# Patient Record
Sex: MALE | Race: BLACK OR AFRICAN AMERICAN | NOT HISPANIC OR LATINO | URBAN - METROPOLITAN AREA
[De-identification: names, ages, dates, MRNs, and addresses within clinical notes are randomized per-mention and may not be internally consistent; named-entity substitution may affect disease eponyms.]

---

## 2021-05-14 ENCOUNTER — EMERGENCY (EMERGENCY)
Facility: HOSPITAL | Age: 31
LOS: 1 days | Discharge: HOME | End: 2021-05-14
Attending: STUDENT IN AN ORGANIZED HEALTH CARE EDUCATION/TRAINING PROGRAM | Admitting: STUDENT IN AN ORGANIZED HEALTH CARE EDUCATION/TRAINING PROGRAM
Payer: COMMERCIAL

## 2021-05-14 VITALS
DIASTOLIC BLOOD PRESSURE: 71 MMHG | HEART RATE: 81 BPM | SYSTOLIC BLOOD PRESSURE: 126 MMHG | RESPIRATION RATE: 16 BRPM | TEMPERATURE: 98 F | OXYGEN SATURATION: 100 %

## 2021-05-14 VITALS
HEART RATE: 73 BPM | DIASTOLIC BLOOD PRESSURE: 72 MMHG | OXYGEN SATURATION: 100 % | SYSTOLIC BLOOD PRESSURE: 110 MMHG | RESPIRATION RATE: 16 BRPM

## 2021-05-14 PROCEDURE — 93010 ELECTROCARDIOGRAM REPORT: CPT

## 2021-05-14 PROCEDURE — 71046 X-RAY EXAM CHEST 2 VIEWS: CPT | Mod: 26

## 2021-05-14 PROCEDURE — 99284 EMERGENCY DEPT VISIT MOD MDM: CPT | Mod: 25

## 2021-05-14 RX ORDER — FAMOTIDINE 10 MG/ML
20 INJECTION INTRAVENOUS ONCE
Refills: 0 | Status: COMPLETED | OUTPATIENT
Start: 2021-05-14 | End: 2021-05-14

## 2021-05-14 RX ADMIN — FAMOTIDINE 20 MILLIGRAM(S): 10 INJECTION INTRAVENOUS at 15:02

## 2021-05-14 RX ADMIN — Medication 30 MILLILITER(S): at 15:02

## 2021-05-14 NOTE — ED PROVIDER NOTE - PROGRESS NOTE DETAILS
CRISTY Trinh: pt feels better ambulating without difficulty.  Discharge reviewed and discussed with patient.

## 2021-05-14 NOTE — ED PROVIDER NOTE - CLINICAL SUMMARY MEDICAL DECISION MAKING FREE TEXT BOX
29 y/o male with a hx of Asthma presents to the ER c/o 2 days of midsternal chest pain only with swallowing. Pt is well appearing, NAD, EKG NSR, will obtain CXR, Maalox, Pepcid, reassess.

## 2021-05-14 NOTE — ED PROVIDER NOTE - NSFOLLOWUPINSTRUCTIONS_ED_ALL_ED_FT
Follow up with your Doctor in 1-2 days.   Follow up with Gastroenterology in 1-2 days. (see attached list)  Rest. Drink plenty of fluids.  Slowly advance diet.  Return to the ER for persistent/worsening or new symptoms fevers, chills, unable to eat/drink, weakness, dizziness, nausea, vomiting, or any concerning symptoms.

## 2021-05-14 NOTE — ED ADULT TRIAGE NOTE - CHIEF COMPLAINT QUOTE
c/o pain to mid upper chest with swallowing. Denies throat pain. States when swallows food or even saliva, pt increases. No drooling, speaking full sentences. Denies N/V. Hx asthma

## 2021-05-14 NOTE — ED PROVIDER NOTE - PATIENT PORTAL LINK FT
You can access the FollowMyHealth Patient Portal offered by [facility] by registering at the following website: [email or web address]. By joining [facility]’s FollowMyHealth portal, you will also be able to view your health information using other applications (apps) compatible with our system.

## 2021-05-14 NOTE — ED PROVIDER NOTE - OBJECTIVE STATEMENT
31 y/o male with a hx of Asthma presents to the ER c/o 2 days of midsternal chest pain only with swallowing.  Pt denies fevers, chills, cough, shortness of breath, back pain, nausea, vomiting, abdominal pain, diarrhea.  Pt reports normal bowel movements.

## 2021-05-14 NOTE — ED PROVIDER NOTE - ATTENDING CONTRIBUTION TO CARE
30M with pmh asthma presenting with intermittentl midsternal chest discomfort only when swallowing. Discomfort minimal but persistent for 2 days. Denies any other symptoms. No fever, chills, sob, nausea, vomiting, diarrhea, dysuria, changes in weight, night sweats, cough. Denies regular ETOH use. Denies ingestions    GEN: NAD, awake, well appearing  HEENT: NCAT, MMM, normal conjunctiva, perrl, posterior oropharynx clear  CHEST/LUNGS: Non-tachypneic, CTAB, bilateral breath sounds  CARDIAC: Non-tachycardic, s1s2, normal perfusion, no peripheral edema  ABDOMEN: Soft, NTND, No rebound/guarding  MSK: No joint tenderness, no gross deformity of extremities  SKIN: No rashes, no petechiae, no vesicles  NEURO: CN grossly intact, normal coordination, no focal motor or sensory deficits  PSYCH: Alert, appropriate, cooperative     Patient presenting with chest discomfort with swallowing. DDx includes GERD, esophageal spasms. Exam unremarkable, tolerating PO, no respiratory concerns or complaints. Screening CXR with likely outpatient GI referral.

## 2023-08-21 ENCOUNTER — EMERGENCY (EMERGENCY)
Facility: HOSPITAL | Age: 33
LOS: 1 days | Discharge: HOME | End: 2023-08-21
Admitting: EMERGENCY MEDICINE
Payer: COMMERCIAL

## 2023-08-21 VITALS
HEART RATE: 79 BPM | SYSTOLIC BLOOD PRESSURE: 127 MMHG | TEMPERATURE: 98 F | DIASTOLIC BLOOD PRESSURE: 72 MMHG | OXYGEN SATURATION: 98 % | RESPIRATION RATE: 16 BRPM

## 2023-08-21 VITALS
RESPIRATION RATE: 16 BRPM | DIASTOLIC BLOOD PRESSURE: 69 MMHG | OXYGEN SATURATION: 100 % | SYSTOLIC BLOOD PRESSURE: 118 MMHG | TEMPERATURE: 98 F | HEART RATE: 69 BPM

## 2023-08-21 PROBLEM — J45.909 UNSPECIFIED ASTHMA, UNCOMPLICATED: Chronic | Status: ACTIVE | Noted: 2021-05-14

## 2023-08-21 LAB
ALBUMIN SERPL ELPH-MCNC: 4.6 G/DL — SIGNIFICANT CHANGE UP (ref 3.3–5)
ALP SERPL-CCNC: 133 U/L — HIGH (ref 40–120)
ALT FLD-CCNC: 16 U/L — SIGNIFICANT CHANGE UP (ref 4–41)
ANION GAP SERPL CALC-SCNC: 13 MMOL/L — SIGNIFICANT CHANGE UP (ref 7–14)
APPEARANCE UR: CLEAR — SIGNIFICANT CHANGE UP
AST SERPL-CCNC: 24 U/L — SIGNIFICANT CHANGE UP (ref 4–40)
BASOPHILS # BLD AUTO: 0.04 K/UL — SIGNIFICANT CHANGE UP (ref 0–0.2)
BASOPHILS NFR BLD AUTO: 0.5 % — SIGNIFICANT CHANGE UP (ref 0–2)
BILIRUB SERPL-MCNC: 0.7 MG/DL — SIGNIFICANT CHANGE UP (ref 0.2–1.2)
BILIRUB UR-MCNC: NEGATIVE — SIGNIFICANT CHANGE UP
BUN SERPL-MCNC: 12 MG/DL — SIGNIFICANT CHANGE UP (ref 7–23)
CALCIUM SERPL-MCNC: 9.5 MG/DL — SIGNIFICANT CHANGE UP (ref 8.4–10.5)
CHLORIDE SERPL-SCNC: 102 MMOL/L — SIGNIFICANT CHANGE UP (ref 98–107)
CO2 SERPL-SCNC: 26 MMOL/L — SIGNIFICANT CHANGE UP (ref 22–31)
COLOR SPEC: YELLOW — SIGNIFICANT CHANGE UP
CREAT SERPL-MCNC: 1.04 MG/DL — SIGNIFICANT CHANGE UP (ref 0.5–1.3)
DIFF PNL FLD: NEGATIVE — SIGNIFICANT CHANGE UP
EGFR: 96 ML/MIN/1.73M2 — SIGNIFICANT CHANGE UP
EOSINOPHIL # BLD AUTO: 0.23 K/UL — SIGNIFICANT CHANGE UP (ref 0–0.5)
EOSINOPHIL NFR BLD AUTO: 2.7 % — SIGNIFICANT CHANGE UP (ref 0–6)
GLUCOSE SERPL-MCNC: 79 MG/DL — SIGNIFICANT CHANGE UP (ref 70–99)
GLUCOSE UR QL: NEGATIVE MG/DL — SIGNIFICANT CHANGE UP
HCT VFR BLD CALC: 41.3 % — SIGNIFICANT CHANGE UP (ref 39–50)
HGB BLD-MCNC: 13.7 G/DL — SIGNIFICANT CHANGE UP (ref 13–17)
IANC: 4.67 K/UL — SIGNIFICANT CHANGE UP (ref 1.8–7.4)
IMM GRANULOCYTES NFR BLD AUTO: 0.2 % — SIGNIFICANT CHANGE UP (ref 0–0.9)
KETONES UR-MCNC: ABNORMAL MG/DL
LEUKOCYTE ESTERASE UR-ACNC: NEGATIVE — SIGNIFICANT CHANGE UP
LIDOCAIN IGE QN: 43 U/L — SIGNIFICANT CHANGE UP (ref 7–60)
LYMPHOCYTES # BLD AUTO: 2.75 K/UL — SIGNIFICANT CHANGE UP (ref 1–3.3)
LYMPHOCYTES # BLD AUTO: 32.4 % — SIGNIFICANT CHANGE UP (ref 13–44)
MCHC RBC-ENTMCNC: 29.4 PG — SIGNIFICANT CHANGE UP (ref 27–34)
MCHC RBC-ENTMCNC: 33.2 GM/DL — SIGNIFICANT CHANGE UP (ref 32–36)
MCV RBC AUTO: 88.6 FL — SIGNIFICANT CHANGE UP (ref 80–100)
MONOCYTES # BLD AUTO: 0.79 K/UL — SIGNIFICANT CHANGE UP (ref 0–0.9)
MONOCYTES NFR BLD AUTO: 9.3 % — SIGNIFICANT CHANGE UP (ref 2–14)
NEUTROPHILS # BLD AUTO: 4.67 K/UL — SIGNIFICANT CHANGE UP (ref 1.8–7.4)
NEUTROPHILS NFR BLD AUTO: 54.9 % — SIGNIFICANT CHANGE UP (ref 43–77)
NITRITE UR-MCNC: NEGATIVE — SIGNIFICANT CHANGE UP
NRBC # BLD: 0 /100 WBCS — SIGNIFICANT CHANGE UP (ref 0–0)
NRBC # FLD: 0 K/UL — SIGNIFICANT CHANGE UP (ref 0–0)
PH UR: 6.5 — SIGNIFICANT CHANGE UP (ref 5–8)
PLATELET # BLD AUTO: 200 K/UL — SIGNIFICANT CHANGE UP (ref 150–400)
POTASSIUM SERPL-MCNC: 4.2 MMOL/L — SIGNIFICANT CHANGE UP (ref 3.5–5.3)
POTASSIUM SERPL-SCNC: 4.2 MMOL/L — SIGNIFICANT CHANGE UP (ref 3.5–5.3)
PROT SERPL-MCNC: 7.7 G/DL — SIGNIFICANT CHANGE UP (ref 6–8.3)
PROT UR-MCNC: SIGNIFICANT CHANGE UP MG/DL
RBC # BLD: 4.66 M/UL — SIGNIFICANT CHANGE UP (ref 4.2–5.8)
RBC # FLD: 12.6 % — SIGNIFICANT CHANGE UP (ref 10.3–14.5)
SODIUM SERPL-SCNC: 141 MMOL/L — SIGNIFICANT CHANGE UP (ref 135–145)
SP GR SPEC: 1.03 — SIGNIFICANT CHANGE UP (ref 1–1.03)
UROBILINOGEN FLD QL: 1 MG/DL — SIGNIFICANT CHANGE UP (ref 0.2–1)
WBC # BLD: 8.5 K/UL — SIGNIFICANT CHANGE UP (ref 3.8–10.5)
WBC # FLD AUTO: 8.5 K/UL — SIGNIFICANT CHANGE UP (ref 3.8–10.5)

## 2023-08-21 PROCEDURE — 74177 CT ABD & PELVIS W/CONTRAST: CPT | Mod: 26,MA

## 2023-08-21 PROCEDURE — 99285 EMERGENCY DEPT VISIT HI MDM: CPT

## 2023-08-21 RX ORDER — SODIUM CHLORIDE 9 MG/ML
1000 INJECTION INTRAMUSCULAR; INTRAVENOUS; SUBCUTANEOUS ONCE
Refills: 0 | Status: COMPLETED | OUTPATIENT
Start: 2023-08-21 | End: 2023-08-21

## 2023-08-21 RX ADMIN — SODIUM CHLORIDE 1000 MILLILITER(S): 9 INJECTION INTRAMUSCULAR; INTRAVENOUS; SUBCUTANEOUS at 11:24

## 2023-08-21 NOTE — ED ADULT TRIAGE NOTE - CHIEF COMPLAINT QUOTE
pt c/o 2 weeks right lower quadrant pain, mild diarrhea at times, denies fever, n/v.  went to UC today and told to come to ED for eval

## 2023-08-21 NOTE — ED PROVIDER NOTE - PHYSICAL EXAMINATION
General: Well appearing in no acute distress, alert and cooperative  Head: Normocephalic, atraumatic  Eyes: PERRLA  Neck: Soft and supple  Cardiac: Regular rate and regular rhythm, no murmurs  Resp: Unlabored respiratory effort, lungs CTAB, speaking in full sentences, no wheezes, rhonchi or rales  Abd: +tenderness in RLQ. +McBurney's point. +psoas sign. All other quadrants without tenderness to palpation. Abd is soft, non-distended, no guarding or rebound tenderness  :  MSK: Spine midline and non-tender, no CVA tenderness b/l  Skin: Warm and dry, no rashes/abrasions/lacerations  Neuro: AO x 3, moves all extremities symmetrically, Motor strength 5/5 bilaterally UE and LE, sensation grossly intact General: Well appearing in no acute distress, alert and cooperative  Head: Normocephalic, atraumatic  Eyes: PERRLA  Neck: Soft and supple  Cardiac: Regular rate and regular rhythm, no murmurs  Resp: Unlabored respiratory effort, lungs CTAB, speaking in full sentences, no wheezes, rhonchi or rales  Abd: +tenderness in RLQ. +McBurney's point. +psoas sign. All other quadrants without tenderness to palpation. Abd is soft, non-distended, no guarding or rebound tenderness  : Performed by CRISTY Rm, chaperoned by CRISTY Reed. No testicular pain or swelling. No masses palpated.   MSK: Spine midline and non-tender, no CVA tenderness b/l  Skin: Warm and dry, no rashes/abrasions/lacerations  Neuro: AO x 3, moves all extremities symmetrically, Motor strength 5/5 bilaterally UE and LE, sensation grossly intact

## 2023-08-21 NOTE — ED PROVIDER NOTE - OBJECTIVE STATEMENT
32 y/o M with no pertinent pmhx presents to the ED c/o RLQ pain x 1.5 weeks. Pt states shortly after playing basketball he developed pain in RLQ. Pt reports gradual onset, 7/10 sharp intermittent pain that he states has been improving over the last week. Pt did not take any medication at home for pain. Pt notes some associated diarrhea over the last week, 2-3x per day, loose stool, denies hematochezia or melena, last BM this AM. Denies fever/chills, N/V, constipation, back pain, CP, SOB, dysuria, testicular pain or swelling, penile pain or abnormal d/c.

## 2023-08-21 NOTE — ED ADULT NURSE NOTE - NSFALLUNIVINTERV_ED_ALL_ED
Bed/Stretcher in lowest position, wheels locked, appropriate side rails in place/Call bell, personal items and telephone in reach/Instruct patient to call for assistance before getting out of bed/chair/stretcher/Non-slip footwear applied when patient is off stretcher/[place] to call system/Physically safe environment - no spills, clutter or unnecessary equipment/Purposeful proactive rounding/Room/bathroom lighting operational, light cord in reach

## 2023-08-21 NOTE — ED ADULT NURSE NOTE - OBJECTIVE STATEMENT
Patient received in stretcher. AOX4. Respirations even and unlabored. Spontaneous movement of all extremities noted. Presents to ER c/o RLQ pain started after playing basketball a week ago. As per patient he thought it was muscle or gas related but it didn't resolve. Denies N/V/D, fevers, chills, CP, SOB. Iv placed Lbas drawn. Fluids infusing. Comfort and safety maintained. All current care needs met. Care plan continued Cleo EUCEDA

## 2023-08-21 NOTE — ED PROVIDER NOTE - NSFOLLOWUPINSTRUCTIONS_ED_ALL_ED_FT
You were seen in the ED for abdominal pain. Your pain is most likely due to a muscle strain.  A copy of the results from today's visit is provided.     Advance activity as tolerated. Continue all previously prescribed medications as directed unless otherwise instructed. Take Tylenol 650mg (Two 325 mg pills) every 4-6 hours as needed for pain or fevers. Take Motrin (also sold as Advil or Ibuprofen) 400-600 mg (two or three 200 mg over the counter pills) every 8 hours as needed for moderate pain or fevers-- take with food; do not take for more than 5 consecutive days.   Follow up with your primary care physician in 48-72 hours- bring copies of your results.    Return to the ER for worsening or persistent symptoms, including but not limited to worsening/persistent pain, fevers, vomiting, chest pain, black or bloody stools, fevers, passing out, lightheadedness, dizziness, inability to pass bowel movements or gas, abdominal distension/swelling, and/or ANY NEW OR CONCERNING SYMPTOMS.      Muscle Strain WHAT YOU NEED TO KNOW:    A muscle strain is a twist, pull, or tear of a muscle or tendon. A tendon is a strong elastic tissue that connects a muscle to a bone. Signs of a strained muscle include bruising and swelling over the area, pain with movement, and loss of strength.    DISCHARGE INSTRUCTIONS:    Seek care immediately or call 911 if:    You suddenly cannot feel or move your injured muscle.    Contact your healthcare provider if:    Your pain and swelling worsen or do not go away.    You have questions or concerns about your condition or care.  Medicines:    NSAIDs, such as ibuprofen, help decrease swelling, pain, and fever. This medicine is available with or without a doctor's order. NSAIDs can cause stomach bleeding or kidney problems in certain people. If you take blood thinner medicine, always ask your healthcare provider if NSAIDs are safe for you. Always read the medicine label and follow directions.    Muscle relaxers help decrease pain and muscle spasms.    Take your medicine as directed. Contact your healthcare provider if you think your medicine is not helping or if you have side effects. Tell him or her if you are allergic to any medicine. Keep a list of the medicines, vitamins, and herbs you take. Include the amounts, and when and why you take them. Bring the list or the pill bottles to follow-up visits. Carry your medicine list with you in case of an emergency.  Follow up with your healthcare provider as directed: Your healthcare provider may suggest that you have a follow-up visit before you go back to your usual activity. Write down your questions so you remember to ask them during your visits.    Self-care:    3 to 7 days after the injury: Use Rest, Ice, Compression, and Elevation (RICE) to help stop bruising and decrease pain and swelling.  Rest: Rest your muscle to allow your injury to heal. When the pain decreases, begin normal, slow movements. For mild and moderate muscle strains, you should rest your muscles for about 2 days. However, if you have a severe muscle strain, you should rest for 10 to 14 days. You may need to use crutches to walk if your muscle strain is in your legs or lower body.    Ice: Put an ice pack on the injured area. Put a towel between the ice pack and your skin. Do not put the ice pack directly on your skin. You can use a package of frozen peas instead of an ice pack.    Compression: You may need to wrap an elastic bandage around the area to decrease swelling. It should be tight enough for you to feel support. Do not wrap it too tightly.    Elevation: Keep the injured muscle raised above your heart if possible. For example, if you have a strain of your lower leg muscle, lie down and prop your leg up on pillows. This helps decrease pain and swelling.    3 to 21 days after the injury: Start to slowly and regularly exercise your strained muscle. This will help it heal. If you feel pain, decrease how hard you are exercising.    1 to 6 weeks after the injury: Stretch the injured muscle. Hold the stretch for about 30 seconds. Do this 4 times a day. You may stretch the muscle until you feel a slight pull. Stop stretching if you feel pain.    2 weeks to 6 months after the injury: The goal of this phase is to return to the activity you were doing before the injury happened, without hurting the muscle again.    3 weeks to 6 months after the injury: Keep stretching and strengthening your muscles to avoid injury. Slowly increase the time and distance that you exercise. You may still have signs and symptoms of muscle strain 6 months after the injury, even if you do things to help it heal. In this case, you may need surgery on the muscle.  Prevent muscle strains:    Always wear proper shoes when you play sports: Replace your old running shoes with new ones often if you are a runner. Use special shoe inserts or arch supports to correct leg or foot problems. Ask your healthcare provider for more information on shoe supports.    Do warm up and cool down exercises: Do stretching exercises before you work out or do sports activities. These exercises will help loosen and decrease stress on your muscles. Cool down and stretch after your workout. Do not stop and rest after a workout without cooling down first.    Keep your muscles strong with strength training exercises: Exercises such as weight lifting and stretching exercises help keep your muscles flexible and strong. A physical therapist or  may help you with these exercises.    Slowly start your exercise or sports training program: Follow your healthcare provider's advice on when to start exercising. Slowly increase time, distance, and how often you train. Sudden increases in how often you train may cause you to injure your muscle again.

## 2023-08-21 NOTE — ED PROVIDER NOTE - CLINICAL SUMMARY MEDICAL DECISION MAKING FREE TEXT BOX
34 y/o M with no pertinent pmhx presents to the ED c/o RLQ pain x 1.5 weeks. Pt states shortly after playing basketball he developed pain in RLQ. Pt reports gradual onset, 7/10 sharp intermittent pain that he states has been improving over the last week. Pt did not take any medication at home for pain. Pt notes some associated diarrhea over the last week, 2-3x per day, loose stool, denies hematochezia or melena, last BM this AM. Denies fever/chills, N/V, constipation, back pain, CP, SOB, dysuria, testicular pain or swelling, penile pain or abnormal d/c.    Patient currently afebrile, hemodynamically stable, spO2 98% on room air. Based on history and physical, differentials include but are not limited to appendicitis vs psoas strain vs meckel's diverticulum. Plan to assess patient for acute pathology as listed above with labs, CT. Pt refusing pain meds at this time as pain is tolerable. If workup negative, likely msk strain and will d/c home with pain control.

## 2023-08-22 LAB
CULTURE RESULTS: NO GROWTH — SIGNIFICANT CHANGE UP
SPECIMEN SOURCE: SIGNIFICANT CHANGE UP

## 2025-07-10 NOTE — ED PROVIDER NOTE - PATIENT PORTAL LINK FT
1week
You can access the FollowMyHealth Patient Portal offered by [facility] by registering at the following website: [email or web address]. By joining [employer]’s FollowMyHealth portal, you will also be able to view your health information using other applications (apps) compatible with our system.